# Patient Record
Sex: FEMALE | Race: WHITE | NOT HISPANIC OR LATINO | Employment: PART TIME | URBAN - METROPOLITAN AREA
[De-identification: names, ages, dates, MRNs, and addresses within clinical notes are randomized per-mention and may not be internally consistent; named-entity substitution may affect disease eponyms.]

---

## 2018-07-30 ENCOUNTER — HOSPITAL ENCOUNTER (OUTPATIENT)
Dept: ONCOLOGY | Facility: HOSPITAL | Age: 28
Discharge: HOME | End: 2018-07-30
Attending: INTERNAL MEDICINE | Admitting: INTERNAL MEDICINE

## 2018-07-30 ENCOUNTER — HOSPITAL ENCOUNTER (OUTPATIENT)
Dept: ONCOLOGY | Facility: CLINIC | Age: 28
Setting detail: INFUSION SERIES
Discharge: HOME | End: 2018-07-30
Attending: INTERNAL MEDICINE | Admitting: INTERNAL MEDICINE

## 2018-07-30 ENCOUNTER — CLINICAL SUPPORT (OUTPATIENT)
Dept: ONCOLOGY | Facility: HOSPITAL | Age: 28
End: 2018-07-30

## 2018-07-30 NOTE — PROGRESS NOTES
PATIENTS ONCOLOGY RECORD LOCATED IN [facility]      Subjective     Name:  HARRIET LUJAN     Date:  2018  Address:  [address] [place] [address] [place] [address]  Home: [phone]  :  1990 AGE:  27 y.o.        RECORDS OBTAINED:  Patients Oncology Record is located in [facility]

## 2018-08-23 ENCOUNTER — HOSPITAL ENCOUNTER (OUTPATIENT)
Dept: ONCOLOGY | Facility: CLINIC | Age: 28
Setting detail: INFUSION SERIES
Discharge: HOME | End: 2018-08-23
Attending: INTERNAL MEDICINE | Admitting: INTERNAL MEDICINE

## 2018-08-23 ENCOUNTER — CLINICAL SUPPORT (OUTPATIENT)
Dept: ONCOLOGY | Facility: HOSPITAL | Age: 28
End: 2018-08-23

## 2018-08-23 NOTE — PROGRESS NOTES
PATIENTS ONCOLOGY RECORD LOCATED IN [facility]      Subjective     Name:  HARRIET LUJAN     Date:  2018  Address:  [address] [place] [address] [place] [address]  Home: [phone]  :  1990 AGE:  28 y.o.        RECORDS OBTAINED:  Patients Oncology Record is located in [facility]

## 2018-09-11 ENCOUNTER — HOSPITAL ENCOUNTER (OUTPATIENT)
Dept: ONCOLOGY | Facility: CLINIC | Age: 28
Discharge: HOME | End: 2018-09-11
Attending: INTERNAL MEDICINE | Admitting: INTERNAL MEDICINE

## 2019-02-19 ENCOUNTER — HOSPITAL ENCOUNTER (OUTPATIENT)
Dept: ONCOLOGY | Facility: CLINIC | Age: 29
Discharge: HOME | End: 2019-02-19
Attending: INTERNAL MEDICINE | Admitting: INTERNAL MEDICINE

## 2019-03-08 ENCOUNTER — HOSPITAL ENCOUNTER (OUTPATIENT)
Dept: ONCOLOGY | Facility: CLINIC | Age: 29
Setting detail: INFUSION SERIES
Discharge: HOME | End: 2019-03-08
Attending: INTERNAL MEDICINE | Admitting: INTERNAL MEDICINE

## 2019-03-08 ENCOUNTER — CLINICAL SUPPORT (OUTPATIENT)
Dept: ONCOLOGY | Facility: HOSPITAL | Age: 29
End: 2019-03-08

## 2019-03-08 NOTE — PROGRESS NOTES
PATIENTS ONCOLOGY RECORD LOCATED IN [facility]      Subjective     Name:  HARRIET LUJAN     Date:  2019  Address:  [address] [place] [address] [place] [address]  Home: [unfilled]  :  1990 AGE:  28 y.o.        RECORDS OBTAINED:  Patients Oncology Record is located in [facility]

## 2019-03-15 ENCOUNTER — CLINICAL SUPPORT (OUTPATIENT)
Dept: ONCOLOGY | Facility: HOSPITAL | Age: 29
End: 2019-03-15

## 2019-03-15 ENCOUNTER — HOSPITAL ENCOUNTER (OUTPATIENT)
Dept: ONCOLOGY | Facility: CLINIC | Age: 29
Setting detail: INFUSION SERIES
Discharge: HOME | End: 2019-03-15
Attending: INTERNAL MEDICINE | Admitting: INTERNAL MEDICINE

## 2019-03-15 NOTE — PROGRESS NOTES
PATIENTS ONCOLOGY RECORD LOCATED IN [facility]      Subjective     Name:  HARRIET LUJAN     Date:  03/15/2019  Address:  [address] [place] [address] [place] [address]  Home: [unfilled]  :  1990 AGE:  28 y.o.        RECORDS OBTAINED:  Patients Oncology Record is located in [facility]

## 2019-04-29 ENCOUNTER — HOSPITAL ENCOUNTER (OUTPATIENT)
Dept: ONCOLOGY | Facility: CLINIC | Age: 29
Setting detail: INFUSION SERIES
Discharge: HOME | End: 2019-04-29
Attending: INTERNAL MEDICINE | Admitting: INTERNAL MEDICINE

## 2019-04-29 ENCOUNTER — CLINICAL SUPPORT (OUTPATIENT)
Dept: ONCOLOGY | Facility: HOSPITAL | Age: 29
End: 2019-04-29

## 2019-04-29 NOTE — PROGRESS NOTES
PATIENTS ONCOLOGY RECORD LOCATED IN [facility]      Subjective     Name:  HARRIET LUJAN     Date:  2019  Address:  [address] [place] [address] [place] [address]  Home: [unfilled]  :  1990 AGE:  28 y.o.        RECORDS OBTAINED:  Patients Oncology Record is located in [facility]

## 2019-11-05 ENCOUNTER — OFFICE VISIT (OUTPATIENT)
Dept: ONCOLOGY | Facility: CLINIC | Age: 29
End: 2019-11-05

## 2019-11-05 VITALS
BODY MASS INDEX: 41.23 KG/M2 | DIASTOLIC BLOOD PRESSURE: 70 MMHG | HEIGHT: 60 IN | HEART RATE: 68 BPM | TEMPERATURE: 98.4 F | RESPIRATION RATE: 18 BRPM | WEIGHT: 210 LBS | SYSTOLIC BLOOD PRESSURE: 115 MMHG

## 2019-11-05 DIAGNOSIS — D50.9 IRON DEFICIENCY ANEMIA, UNSPECIFIED IRON DEFICIENCY ANEMIA TYPE: Primary | ICD-10-CM

## 2019-11-05 PROCEDURE — 99213 OFFICE O/P EST LOW 20 MIN: CPT | Performed by: INTERNAL MEDICINE

## 2019-11-05 RX ORDER — DOXYCYCLINE HYCLATE 50 MG/1
1 CAPSULE, GELATIN COATED ORAL 2 TIMES DAILY
Refills: 1 | COMMUNITY
Start: 2019-11-03 | End: 2019-12-04 | Stop reason: SDUPTHER

## 2019-11-05 RX ORDER — NORGESTIMATE AND ETHINYL ESTRADIOL 7DAYSX3 28
KIT ORAL
COMMUNITY
Start: 2019-08-19

## 2019-11-05 RX ORDER — SUCRALFATE 1 G/1
TABLET ORAL
Refills: 6 | COMMUNITY
Start: 2019-10-10

## 2019-11-05 RX ORDER — OMEPRAZOLE 40 MG/1
CAPSULE, DELAYED RELEASE ORAL
COMMUNITY
Start: 2019-10-14

## 2019-11-05 RX ORDER — LEVOTHYROXINE SODIUM 0.03 MG/1
25 TABLET ORAL DAILY
COMMUNITY

## 2019-11-05 NOTE — PROGRESS NOTES
HEMATOLOGY ONCOLOGY FOLLOW UP        Patient name: Savanah Reyez  : 1990  MRN: [id number]  Primary Care Physician: Anthony Iraheta MD  Referring Physician: Anthony Iraheta,*  Reason For Consult:     Chief Complaint   Patient presents with   • Follow-up     FARHANA       History of Present Illness:  Ms. Reyez is a pleasant 28-year-old female who presents to our office on 18 for consultation regarding iron deficiency anemia.  She had lab work done on 18 that showed ferritin of 7 and iron saturation of 55.  Her hemoglobin was 10.4 on 7/3/18.  She is referred to us for iron deficiency anemia.  She reports history of menorrhagia.    • 7/3/18 - WBC 8.2, hemoglobin 10.4, platelet count 361,000, MCV 82.3.    • 7/10/18 - Creatinine 0.49, BUN 6.  LFTs normal.    • 18 - Iron saturation 5%, serum iron 18, TIBC 370.  Folate 12.7.  B12 543.  Ferritin 7.1.    2018 - The patient presents for initial consultation.  She reports irregular and heavy menstrual cycles.  She has to use four large pads for about 6-7 days each cycle.  She is not on any iron.  She reports stomach pain.  She had an episode of dark black stool last month.  She is scheduled for an upper GI endoscopy with Dr. Harding on 18.    • 18 - B12 606.   • 8/10/18 - EGD:  Grade 4 erosive esophagitis.  Distal esophageal stricture.  Grade 3 to grade 4 erosive gastritis and duodenitis.  Gastric biopsy was unremarkable.  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  Gastric fundus mucosa with chronic inflammation.    • 18 - Stool Hemoccult test negative x3.    • 18 - Ferritin 6.4 (L).  Iron saturation 6% (L), TIBC 405, serum iron 24.    • 19 - WBC 7.2, hemoglobin 13, platelet count 359,000.  Ferritin 10 (L).  Iron saturation 15%, TIBC 487 (H), serum iron 75.  (Patient was on iron p.o. two times a day.)  • 3/8/19 - Patient received Injectafer 750 mg x1.    • 3/15/19 -  Patient received Injectafer 750 mg week 2.    • 4/23/19 - Serum iron 83, TIBC 251, iron saturation 25%.  WBC 8.1, hemoglobin 14.2, platelet count 337,000, MCV 88.9.  \  • 10/29/2019 WBC 7.1, hemoglobin 13.3, MCV 92.5, platelets 351, ferritin 131, iron 70  iron saturation 20%, TIBC 345        Subjective:11/05/19  Patient is here for a follow up appointment. She did have heavy menstrual cycles, but has started birth control and that has subsided. She does take iron po twice daily.  She is tolerating iron supplements well.  No complaints.  She denies any fatigue.          The following portions of the patient's history were reviewed and updated as appropriate: allergies, current medications, past family history, past medical history, past social history, past surgical history and problem list.         Past Medical History:   Diagnosis Date   • Anxiety    • Broken arm 2000   • Chronic headaches    • Hirsutism    • Hypothyroidism    • Irregular periods    • Metabolic syndrome    • Obesity    • PCOS (polycystic ovarian syndrome)    • Psoriasis        Past Surgical History:   Procedure Laterality Date   • CYST REMOVAL      under left breast   • ENDOSCOPY           Current Outpatient Medications:   •  ferrous gluconate (FERGON) 324 MG tablet, Take 1 tablet by mouth 2 (Two) Times a Day., Disp: , Rfl: 1  •  levothyroxine (SYNTHROID, LEVOTHROID) 25 MCG tablet, Take 25 mcg by mouth Daily., Disp: , Rfl:   •  metFORMIN (GLUCOPHAGE) 500 MG tablet, Take 500 mg by mouth 2 (Two) Times a Day., Disp: , Rfl: 0  •  omeprazole (priLOSEC) 40 MG capsule, , Disp: , Rfl:   •  sucralfate (CARAFATE) 1 g tablet, TAKE 1 TABLET BY MOUTH 4 TIMES DAILY FOR 30 DAYS, Disp: , Rfl: 6  •  TRI-SPRINTEC 0.18/0.215/0.25 MG-35 MCG per tablet, , Disp: , Rfl:   •  VITAMIN E PO, Take 1 tablet by mouth Daily., Disp: , Rfl:     No Known Allergies    Family History   Problem Relation Age of Onset   • Anemia Father        Cancer-related family history is not on  "file.    Social History     Tobacco Use   • Smoking status: Never Smoker   • Smokeless tobacco: Never Used   Substance Use Topics   • Alcohol use: No     Frequency: Never   • Drug use: No         I have reviewed the history of present illness, past medical history, family history, social history, lab results, all notes and other records since the patient was last seen on  Visit date not found.    ROS:     Review of Systems   Constitutional: Negative for chills and fever.   HENT: Negative for ear pain, mouth sores, nosebleeds and sore throat.    Eyes: Negative for photophobia and visual disturbance.   Respiratory: Negative for wheezing and stridor.    Cardiovascular: Negative for chest pain and palpitations.   Gastrointestinal: Negative for abdominal pain, diarrhea, nausea and vomiting.   Endocrine: Negative for cold intolerance and heat intolerance.   Genitourinary: Negative for dysuria and hematuria.   Musculoskeletal: Negative for joint swelling and neck stiffness.   Skin: Negative for color change and rash.   Neurological: Negative for seizures and syncope.   Hematological: Negative for adenopathy.        No obvious bleeding   Psychiatric/Behavioral: Negative for agitation, confusion and hallucinations.       Objective:    Vitals:    11/05/19 1430   BP: 115/70   Pulse: 68   Resp: 18   Temp: 98.4 °F (36.9 °C)   TempSrc: Oral   Weight: 95.3 kg (210 lb)   Height: 152.4 cm (60\")   PainSc: 0-No pain     ECOG  (0) Fully active, able to carry on all predisease performance without restriction    Physical Exam:   Physical Exam   Constitutional: She is oriented to person, place, and time. No distress.   HENT:   Head: Normocephalic and atraumatic.   Eyes: Conjunctivae and EOM are normal. Right eye exhibits no discharge. Left eye exhibits no discharge. No scleral icterus.   Neck: Normal range of motion. Neck supple. No thyromegaly present.   Cardiovascular: Normal rate, regular rhythm and normal heart sounds. Exam reveals no " gallop and no friction rub.   Pulmonary/Chest: Effort normal. No stridor. No respiratory distress. She has no wheezes.   Abdominal: Soft. Bowel sounds are normal. She exhibits no mass. There is no tenderness. There is no rebound and no guarding.   Musculoskeletal: Normal range of motion. She exhibits no tenderness.   Lymphadenopathy:     She has no cervical adenopathy.   Neurological: She is alert and oriented to person, place, and time. She exhibits normal muscle tone.   Skin: Skin is warm. No rash noted. She is not diaphoretic. No erythema.   Psychiatric: She has a normal mood and affect. Her behavior is normal.   Nursing note and vitals reviewed.            No results for input(s): WBC, HGB, HCT, PLT, MCV in the last 69693 hours.  No results for input(s): NA, K, CL, CO2, BUN, CREATININE, CALCIUM, BILITOT, ALKPHOS, ALT, AST, GLUCOSE in the last 58579 hours.    Invalid input(s): LABALBU, PROT    No results found for: GLUCOSE, BUN, CREATININE, EGFRIFNONA, EGFRIFAFRI, BCR, K, CO2, CALCIUM, PROTENTOTREF, ALBUMIN, LABIL2, AST, ALT    No results for input(s): APTT, INR, PTT in the last 11134 hours.    No results found for: IRON, TIBC, FERRITIN    No results found for: FOLATE    No results found for: OCCULTBLD    No results found for: RETICCTPCT    No results found for: [id number]  No results found for: SPEP, UPEP  No results found for: LDH, URICACID  No results found for: BRAULIO, RF, SEDRATE  No results found for: FIBRINOGEN, HAPTOGLOBIN  No results found for: PTT, INR  No results found for:   No results found for: CEA  No components found for: CA-19-9  No results found for: PSA          Assessment/Plan     Assessment:  1. Iron deficiency anemia:  Etiology could be menorrhagia.  She had an EGD and that shows Dela Cruz’s mucosa in the distal esophagus.  There is some chronic inflammation in the stomach.  EGD reported as showing erosive gastritis and duodenitis.  She is currently taking two iron pills a day.  Despite  that, she had low iron levels in February and therefore, was given two doses of Injectafer.  Iron levels improved after Injectafer.  She also likely has poor iron absorption.  2. Menorrhagia:  She is now on oral contraceptive pills.  She continues to have heavy cycles.    3. Dela Cruz’s esophagus:  Will need regular screening endoscopies.      PLAN:  1. Continue iron p.o. two times a day.  2. Repeat iron levels in six months.  If iron levels continue to increase, we can discontinue her iron or  lower the dose of her iron supplements.  3. Advised to discuss with her PCP regarding her menorrhagia.  4. Follow up in six months.        Iron deficiency anemia, unspecified iron deficiency anemia type  - CBC & Differential  - Ferritin  - Iron Profile    Orders Placed This Encounter   Procedures   • Ferritin     1 week prior to appt in 6 months     Standing Status:   Future     Scheduling Instructions:      Please draw these labs prior to the next visit.   • Iron Profile     1 week prior to appt in 6 months     Standing Status:   Future     Scheduling Instructions:      Please draw these labs prior to the next visit.   • CBC & Differential     Please draw these labs prior to the next visit.  1 week prior to appt in 6 months     Standing Status:   Future     Scheduling Instructions:      Please draw these labs prior to the next visit.     Order Specific Question:   Manual Differential     Answer:   No                     Thank you very much for providing the opportunity to participate in this patient’s care. Please do not hesitate to call if there are any other questions.    I have reviewed all relevant labs results,outside reports, imaging,,notes, vitals, medications and plan with the patient today.    Portions of the note have been Scribed by medical assistant/Nurse.  I have reviewed and made changes accordingly.

## 2019-12-05 RX ORDER — DOXYCYCLINE HYCLATE 50 MG/1
CAPSULE, GELATIN COATED ORAL
Qty: 60 TABLET | Refills: 1 | Status: SHIPPED | OUTPATIENT
Start: 2019-12-05 | End: 2020-02-03

## 2020-02-03 RX ORDER — DOXYCYCLINE HYCLATE 50 MG/1
CAPSULE, GELATIN COATED ORAL
Qty: 60 TABLET | Refills: 1 | Status: SHIPPED | OUTPATIENT
Start: 2020-02-03 | End: 2020-04-03

## 2020-04-03 RX ORDER — DOXYCYCLINE HYCLATE 50 MG/1
CAPSULE, GELATIN COATED ORAL
Qty: 60 TABLET | Refills: 0 | Status: SHIPPED | OUTPATIENT
Start: 2020-04-03 | End: 2020-05-04

## 2020-05-04 RX ORDER — DOXYCYCLINE HYCLATE 50 MG/1
CAPSULE, GELATIN COATED ORAL
Qty: 60 TABLET | Refills: 0 | Status: SHIPPED | OUTPATIENT
Start: 2020-05-04 | End: 2020-05-05

## 2020-05-05 RX ORDER — DOXYCYCLINE HYCLATE 50 MG/1
CAPSULE, GELATIN COATED ORAL
Qty: 60 TABLET | Refills: 0 | Status: SHIPPED | OUTPATIENT
Start: 2020-05-05 | End: 2020-07-06

## 2020-07-06 RX ORDER — DOXYCYCLINE HYCLATE 50 MG/1
CAPSULE, GELATIN COATED ORAL
Qty: 60 TABLET | Refills: 0 | Status: SHIPPED | OUTPATIENT
Start: 2020-07-06 | End: 2020-08-17

## 2020-08-17 RX ORDER — DOXYCYCLINE HYCLATE 50 MG/1
CAPSULE, GELATIN COATED ORAL
Qty: 60 TABLET | Refills: 0 | Status: SHIPPED | OUTPATIENT
Start: 2020-08-17 | End: 2020-11-02

## 2020-11-02 RX ORDER — DOXYCYCLINE HYCLATE 50 MG/1
1 CAPSULE, GELATIN COATED ORAL 2 TIMES DAILY
Qty: 60 TABLET | Refills: 5 | Status: SHIPPED | OUTPATIENT
Start: 2020-11-02 | End: 2021-05-18

## 2020-11-06 DIAGNOSIS — D50.9 IRON DEFICIENCY ANEMIA, UNSPECIFIED IRON DEFICIENCY ANEMIA TYPE: ICD-10-CM

## 2020-11-07 LAB
BASOPHILS # BLD AUTO: 0.1 X10E3/UL (ref 0–0.2)
BASOPHILS NFR BLD AUTO: 1 %
EOSINOPHIL # BLD AUTO: 0.1 X10E3/UL (ref 0–0.4)
EOSINOPHIL NFR BLD AUTO: 2 %
ERYTHROCYTE [DISTWIDTH] IN BLOOD BY AUTOMATED COUNT: 11.9 % (ref 11.7–15.4)
FERRITIN SERPL-MCNC: 36 NG/ML (ref 15–150)
HCT VFR BLD AUTO: 37.5 % (ref 34–46.6)
HGB BLD-MCNC: 12.7 G/DL (ref 11.1–15.9)
IMM GRANULOCYTES # BLD AUTO: 0 X10E3/UL (ref 0–0.1)
IMM GRANULOCYTES NFR BLD AUTO: 0 %
IRON SATN MFR SERPL: 10 % (ref 15–55)
IRON SERPL-MCNC: 39 UG/DL (ref 27–159)
LYMPHOCYTES # BLD AUTO: 2.6 X10E3/UL (ref 0.7–3.1)
LYMPHOCYTES NFR BLD AUTO: 39 %
MCH RBC QN AUTO: 28.9 PG (ref 26.6–33)
MCHC RBC AUTO-ENTMCNC: 33.9 G/DL (ref 31.5–35.7)
MCV RBC AUTO: 85 FL (ref 79–97)
MONOCYTES # BLD AUTO: 0.5 X10E3/UL (ref 0.1–0.9)
MONOCYTES NFR BLD AUTO: 7 %
NEUTROPHILS # BLD AUTO: 3.4 X10E3/UL (ref 1.4–7)
NEUTROPHILS NFR BLD AUTO: 51 %
PLATELET # BLD AUTO: 338 X10E3/UL (ref 150–450)
RBC # BLD AUTO: 4.4 X10E6/UL (ref 3.77–5.28)
TIBC SERPL-MCNC: 408 UG/DL (ref 250–450)
UIBC SERPL-MCNC: 369 UG/DL (ref 131–425)
WBC # BLD AUTO: 6.6 X10E3/UL (ref 3.4–10.8)

## 2020-11-10 ENCOUNTER — OFFICE VISIT (OUTPATIENT)
Dept: ONCOLOGY | Facility: CLINIC | Age: 30
End: 2020-11-10

## 2020-11-10 VITALS
WEIGHT: 209.5 LBS | BODY MASS INDEX: 41.13 KG/M2 | HEIGHT: 60 IN | TEMPERATURE: 97.6 F | RESPIRATION RATE: 16 BRPM | SYSTOLIC BLOOD PRESSURE: 133 MMHG | DIASTOLIC BLOOD PRESSURE: 84 MMHG | HEART RATE: 80 BPM

## 2020-11-10 DIAGNOSIS — D50.9 IRON DEFICIENCY ANEMIA, UNSPECIFIED IRON DEFICIENCY ANEMIA TYPE: Primary | ICD-10-CM

## 2020-11-10 PROCEDURE — 99213 OFFICE O/P EST LOW 20 MIN: CPT | Performed by: INTERNAL MEDICINE

## 2020-11-10 NOTE — PROGRESS NOTES
HEMATOLOGY ONCOLOGY FOLLOW UP        Patient name: Savanah Reyez  : 1990  MRN: [id number]  Primary Care Physician: Anthony Iraheta MD  Referring Physician: Anthony Iraheta,*  Reason For Consult:     Chief Complaint   Patient presents with   • Follow-up     Iron deficiency anemia, unspecified iron deficiency anemia type       History of Present Illness:  Ms. Reyez is a pleasant 30 y.o.female who presents to our office on 18 for consultation regarding iron deficiency anemia.  She had lab work done on 18 that showed ferritin of 7 and iron saturation of 55.  Her hemoglobin was 10.4 on 7/3/18.  She is referred to us for iron deficiency anemia.  She reports history of menorrhagia.    • 7/3/18 - WBC 8.2, hemoglobin 10.4, platelet count 361,000, MCV 82.3.    • 7/10/18 - Creatinine 0.49, BUN 6.  LFTs normal.    • 18 - Iron saturation 5%, serum iron 18, TIBC 370.  Folate 12.7.  B12 543.  Ferritin 7.1.    2018 - The patient presents for initial consultation.  She reports irregular and heavy menstrual cycles.  She has to use four large pads for about 6-7 days each cycle.  She is not on any iron.  She reports stomach pain.  She had an episode of dark black stool last month.  She is scheduled for an upper GI endoscopy with Dr. Harding on 18.    • 18 - B12 606.   • 8/10/18 - EGD:  Grade 4 erosive esophagitis.  Distal esophageal stricture.  Grade 3 to grade 4 erosive gastritis and duodenitis.  Gastric biopsy was unremarkable.  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  Gastric fundus mucosa with chronic inflammation.    • 18 - Stool Hemoccult test negative x3.    • 18 - Ferritin 6.4 (L).  Iron saturation 6% (L), TIBC 405, serum iron 24.    • 19 - WBC 7.2, hemoglobin 13, platelet count 359,000.  Ferritin 10 (L).  Iron saturation 15%, TIBC 487 (H), serum iron 75.  (Patient was on iron p.o. two times a day.)  • 3/8/19  - Patient received Injectafer 750 mg x1.    • 3/15/19 - Patient received Injectafer 750 mg week 2.    • 4/23/19 - Serum iron 83, TIBC 251, iron saturation 25%.  WBC 8.1, hemoglobin 14.2, platelet count 337,000, MCV 88.9.  \  • 10/29/2019 WBC 7.1, hemoglobin 13.3, MCV 92.5, platelets 351, ferritin 131, iron 70  iron saturation 20%, TIBC 345  • 5/1/2020: WBC 6.9, hemoglobin 13.9, MCV 90.2, platelets 319, ferritin 53.6, iron saturation 39%, serum iron 143, TIBC 363  • 11/6/2020: Ferritin 36, , iron saturation 10% low, serum iron 39, TIBC 408, WBC 6.6, hemoglobin 12.7, platelets 338        Subjective:  Patient presents for a follow up regarding iron deficiency anemia.  She is currently taking 1 iron tablet daily.  She is reporting symptoms of fatigue.  She continues to have menorrhagia.  She is going to start a new job with a better insurance.        The following portions of the patient's history were reviewed and updated as appropriate: allergies, current medications, past family history, past medical history, past social history, past surgical history and problem list.         Past Medical History:   Diagnosis Date   • Anxiety    • Broken arm 2000   • Chronic headaches    • Hirsutism    • Hypothyroidism    • Irregular periods    • Metabolic syndrome    • Obesity    • PCOS (polycystic ovarian syndrome)    • Psoriasis        Past Surgical History:   Procedure Laterality Date   • CYST REMOVAL      under left breast   • ENDOSCOPY           Current Outpatient Medications:   •  Cholecalciferol (VITAMIN D3) 50 MCG (2000 UT) capsule, Take 2,000 Units by mouth Daily., Disp: , Rfl:   •  ferrous gluconate (FERGON) 324 MG tablet, Take 1 tablet by mouth 2 (Two) Times a Day., Disp: 60 tablet, Rfl: 5  •  levothyroxine (SYNTHROID, LEVOTHROID) 25 MCG tablet, Take 25 mcg by mouth Daily., Disp: , Rfl:   •  metFORMIN (GLUCOPHAGE) 500 MG tablet, Take 500 mg by mouth 2 (Two) Times a Day., Disp: , Rfl: 0  •  metFORMIN ER (GLUCOPHAGE-XR)  500 MG 24 hr tablet, TAKE 2 TABLETS BY MOUTH ONCE DAILY FOR 90 DAYS, Disp: , Rfl:   •  omeprazole (priLOSEC) 40 MG capsule, , Disp: , Rfl:   •  sucralfate (CARAFATE) 1 g tablet, TAKE 1 TABLET BY MOUTH 4 TIMES DAILY FOR 30 DAYS, Disp: , Rfl: 6  •  TRI-SPRINTEC 0.18/0.215/0.25 MG-35 MCG per tablet, , Disp: , Rfl:   •  VITAMIN E PO, Take 1 tablet by mouth Daily., Disp: , Rfl:     No Known Allergies    Family History   Problem Relation Age of Onset   • Anemia Father        Cancer-related family history is not on file.    Social History     Tobacco Use   • Smoking status: Never Smoker   • Smokeless tobacco: Never Used   Substance Use Topics   • Alcohol use: No     Frequency: Never   • Drug use: No         I have reviewed the history of present illness, past medical history, family history, social history, lab results, all notes and other records since the patient was last seen on  Visit date not found.    ROS:     Review of Systems   Constitutional: Positive for fatigue. Negative for chills, diaphoresis and fever.   HENT: Negative for ear pain, mouth sores, nosebleeds and sore throat.    Eyes: Negative for photophobia and visual disturbance.   Respiratory: Negative for wheezing and stridor.    Cardiovascular: Negative for chest pain and palpitations.   Gastrointestinal: Negative for abdominal pain, diarrhea, nausea and vomiting.   Endocrine: Negative for cold intolerance and heat intolerance.   Genitourinary: Positive for menstrual problem. Negative for dysuria and hematuria.   Musculoskeletal: Negative for joint swelling and neck stiffness.   Skin: Negative for color change and rash.   Neurological: Negative for seizures and syncope.   Hematological: Negative for adenopathy.        No obvious bleeding   Psychiatric/Behavioral: Negative for agitation, confusion and hallucinations.       Objective:    Vitals:    11/10/20 1445   BP: 133/84   Pulse: 80   Resp: 16   Temp: 97.6 °F (36.4 °C)   Weight: 95 kg (209 lb 8 oz)  "  Height: 152.4 cm (60\")     ECOG  (0) Fully active, able to carry on all predisease performance without restriction    Physical Exam:   Physical Exam   Constitutional: She is oriented to person, place, and time. She appears well-developed.   HENT:   Head: Normocephalic and atraumatic.   Nose: Nose normal.   Mouth/Throat: No oropharyngeal exudate.   Eyes: Conjunctivae are normal. No scleral icterus.   Neck: Normal range of motion. No tracheal deviation present. No thyromegaly present.   Cardiovascular: Regular rhythm, normal heart sounds and normal pulses.   Pulmonary/Chest: No stridor.   Abdominal: Soft. Normal appearance and bowel sounds are normal. She exhibits no distension and no mass. There is no abdominal tenderness.   Musculoskeletal: Normal range of motion. No deformity.   Lymphadenopathy:     She has no cervical adenopathy.   Neurological: She is alert and oriented to person, place, and time. No sensory deficit.   Skin: Skin is warm.   Psychiatric: Her behavior is normal. Mood, judgment and thought content normal.   Vitals reviewed.      Lab Results - Last 18 Months   Lab Units 11/06/20  1453   WBC x10E3/uL 6.6   HEMOGLOBIN g/dL 12.7   HEMATOCRIT % 37.5   PLATELETS x10E3/uL 338   MCV fL 85     No results for input(s): NA, K, CL, CO2, BUN, CREATININE, CALCIUM, BILITOT, ALKPHOS, ALT, AST, GLUCOSE in the last 62810 hours.    Invalid input(s): LABALBU, PROT    No results found for: GLUCOSE, BUN, CREATININE, EGFRIFNONA, EGFRIFAFRI, BCR, K, CO2, CALCIUM, PROTENTOTREF, ALBUMIN, LABIL2, BILIRUBIN, AST, ALT    No results for input(s): APTT, INR, PTT in the last 01365 hours.    Lab Results   Component Value Date    TIBC 408 11/06/2020    FERRITIN 36 11/06/2020       No results found for: FOLATE    No results found for: OCCULTBLD    No results found for: RETICCTPCT    No results found for: [id number]  No results found for: SPEP, UPEP  No results found for: LDH, URICACID  No results found for: BRAULIO, RF, SEDRATE  No " results found for: FIBRINOGEN, HAPTOGLOBIN  No results found for: PTT, INR  No results found for:   No results found for: CEA  No components found for: CA-19-9  No results found for: PSA          Assessment/Plan     Assessment:  1. Iron deficiency anemia:  Etiology could be menorrhagia.  She had an EGD and that shows Dela Cruz’s mucosa in the distal esophagus.  There is some chronic inflammation in the stomach.  EGD reported as showing erosive gastritis and duodenitis.  She is currently taking two iron pills a day.  Despite that, she had low iron levels in February and therefore, was given two doses of Injectafer. She also likely has poor iron absorption.  She is now on oral iron.  Recent iron stores are somewhat lower.  2. Menorrhagia:  She is now on oral contraceptive pills.  Her menorrhagia has definitely decreased but has not completely gone away.  3. Dela Cruz’s esophagus:  Will need regular screening endoscopies.      PLAN:  1. Iron stores are somewhat lower.  Advised to increase iron supplement to p.o. twice daily.  2. Her insurance is going to be changed.  She is planning to see a gynecologist at that point since she will have better insurance.  3. Follow up in six months with CBC and iron levels..        There are no diagnoses linked to this encounter.  No orders of the defined types were placed in this encounter.      I have reviewed and confirmed the accuracy of the patient's history: Chief complaint, HPI, ROS and Subjective as entered by the MA/LPN/RN. Fernando Lora MD 11/10/20         Electronically signed by Fernando Lora MD, 11/10/20, 3:23 PM EST.

## 2020-11-17 ENCOUNTER — APPOINTMENT (OUTPATIENT)
Dept: ONCOLOGY | Facility: CLINIC | Age: 30
End: 2020-11-17

## 2021-04-16 ENCOUNTER — TELEPHONE (OUTPATIENT)
Dept: ONCOLOGY | Facility: CLINIC | Age: 31
End: 2021-04-16

## 2021-04-16 NOTE — TELEPHONE ENCOUNTER
Caller: Savanah Reyez    Relationship: Self    Best call back number: [phone]     What is the best time to reach you: ANY    Who are you requesting to speak with (clinical staff, provider,  specific staff member): ANY    Do you know the name of the person who called: PATIENT    What was the call regarding: *PATIENT WANTED TO KNOW IF SHE NEEDED TO SCHEDULE LABS BEFORE HER APPOINTMENT, PATIENT CALLED INTO [facility].    Do you require a callback: YES

## 2021-04-29 ENCOUNTER — TELEPHONE (OUTPATIENT)
Dept: ONCOLOGY | Facility: CLINIC | Age: 31
End: 2021-04-29

## 2021-04-29 DIAGNOSIS — D50.9 IRON DEFICIENCY ANEMIA, UNSPECIFIED IRON DEFICIENCY ANEMIA TYPE: Primary | ICD-10-CM

## 2021-04-29 NOTE — TELEPHONE ENCOUNTER
Caller: PT    Relationship: PT    Best call back number: [phone]    What is the best time to reach you:    Who are you requesting to speak with (clinical staff, provider,  specific staff member): CLINICAL    Do you know the name of the person who called:    What was the call regarding: PT VERIFYING IF SHE WILL NEED LABS 5/25 APPT?     Do you require a callback: YES

## 2021-05-17 ENCOUNTER — TELEPHONE (OUTPATIENT)
Dept: ONCOLOGY | Facility: CLINIC | Age: 31
End: 2021-05-17

## 2021-05-17 NOTE — TELEPHONE ENCOUNTER
Caller: ABE LUJAN    Relationship to patient: SELF    Best call back number: [phone]    Type of visit: FOLLOW UP    Requested date: 6/10 OR 6/24    If rescheduling, when is the original appointment: 5/25    Additional notes: PLEASE CALL TO RESCHEDULE

## 2021-05-18 RX ORDER — DOXYCYCLINE HYCLATE 50 MG/1
1 CAPSULE, GELATIN COATED ORAL 2 TIMES DAILY
Qty: 60 TABLET | Refills: 3 | Status: SHIPPED | OUTPATIENT
Start: 2021-05-18 | End: 2021-09-22

## 2021-05-18 RX ORDER — DOXYCYCLINE HYCLATE 50 MG/1
CAPSULE, GELATIN COATED ORAL
Qty: 60 TABLET | Refills: 4 | Status: SHIPPED | OUTPATIENT
Start: 2021-05-18 | End: 2021-05-18 | Stop reason: SDUPTHER

## 2021-05-24 ENCOUNTER — TELEPHONE (OUTPATIENT)
Dept: ONCOLOGY | Facility: CLINIC | Age: 31
End: 2021-05-24

## 2021-05-25 ENCOUNTER — TELEPHONE (OUTPATIENT)
Dept: ONCOLOGY | Facility: CLINIC | Age: 31
End: 2021-05-25

## 2021-05-25 ENCOUNTER — APPOINTMENT (OUTPATIENT)
Dept: ONCOLOGY | Facility: CLINIC | Age: 31
End: 2021-05-25

## 2021-06-08 NOTE — PROGRESS NOTES
HEMATOLOGY ONCOLOGY FOLLOW UP        Patient name: Savanah Reyez  : 1990  MRN: [id number]  Primary Care Physician: Anthony Iraheta MD  Referring Physician: Anthony Iraheta,*  Reason For Consult:     Chief Complaint   Patient presents with   • Follow-up     Iron deficiency anemia, unspecified iron deficiency anemia type       History of Present Illness:  Ms. Reyez is a pleasant 30 y.o.female who presents to our office on 18 for consultation regarding iron deficiency anemia.  She had lab work done on 18 that showed ferritin of 7 and iron saturation of 55.  Her hemoglobin was 10.4 on 7/3/18.  She is referred to us for iron deficiency anemia.  She reports history of menorrhagia.    • 7/3/18 - WBC 8.2, hemoglobin 10.4, platelet count 361,000, MCV 82.3.    • 7/10/18 - Creatinine 0.49, BUN 6.  LFTs normal.    • 18 - Iron saturation 5%, serum iron 18, TIBC 370.  Folate 12.7.  B12 543.  Ferritin 7.1.    2018 - The patient presents for initial consultation.  She reports irregular and heavy menstrual cycles.  She has to use four large pads for about 6-7 days each cycle.  She is not on any iron.  She reports stomach pain.  She had an episode of dark black stool last month.  She is scheduled for an upper GI endoscopy with Dr. Harding on 18.    • 18 - B12 606.   • 8/10/18 - EGD:  Grade 4 erosive esophagitis.  Distal esophageal stricture.  Grade 3 to grade 4 erosive gastritis and duodenitis.  Gastric biopsy was unremarkable.  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  Gastric fundus mucosa with chronic inflammation.    • 18 - Stool Hemoccult test negative x3.    • 18 - Ferritin 6.4 (L).  Iron saturation 6% (L), TIBC 405, serum iron 24.    • 19 - WBC 7.2, hemoglobin 13, platelet count 359,000.  Ferritin 10 (L).  Iron saturation 15%, TIBC 487 (H), serum iron 75.  (Patient was on iron p.o. two times a day.)  • 3/8/19  - Patient received Injectafer 750 mg x1.    • 3/15/19 - Patient received Injectafer 750 mg week 2.    • 4/23/19 - Serum iron 83, TIBC 251, iron saturation 25%.  WBC 8.1, hemoglobin 14.2, platelet count 337,000, MCV 88.9.  \  • 10/29/2019 WBC 7.1, hemoglobin 13.3, MCV 92.5, platelets 351, ferritin 131, iron 70  iron saturation 20%, TIBC 345  • 5/1/2020: WBC 6.9, hemoglobin 13.9, MCV 90.2, platelets 319, ferritin 53.6, iron saturation 39%, serum iron 143, TIBC 363  • 11/6/2020: Ferritin 36, , iron saturation 10% low, serum iron 39, TIBC 408, WBC 6.6, hemoglobin 12.7, platelets 338  • 5/18/2021: Iron 91, WBC 6.9, hemoglobin 13, platelets 356, ferritin 19, iron saturation 20%, TIBC at 458        Subjective:    Patient presents for a follow up regarding iron deficiency anemia.  She is currently taking 2 iron tablet daily.  She is reporting symptoms of fatigue.  She continues to have menorrhagia.         The following portions of the patient's history were reviewed and updated as appropriate: allergies, current medications, past family history, past medical history, past social history, past surgical history and problem list.         Past Medical History:   Diagnosis Date   • Anxiety    • Broken arm 2000   • Chronic headaches    • Hirsutism    • Hypothyroidism    • Irregular periods    • Metabolic syndrome    • Obesity    • PCOS (polycystic ovarian syndrome)    • Psoriasis        Past Surgical History:   Procedure Laterality Date   • CYST REMOVAL      under left breast   • ENDOSCOPY           Current Outpatient Medications:   •  ferrous gluconate (FERGON) 324 MG tablet, Take 1 tablet by mouth 2 (Two) Times a Day., Disp: 60 tablet, Rfl: 3  •  levothyroxine (SYNTHROID, LEVOTHROID) 25 MCG tablet, Take 25 mcg by mouth Daily., Disp: , Rfl:   •  metFORMIN (GLUCOPHAGE) 500 MG tablet, Take 500 mg by mouth 2 (Two) Times a Day., Disp: , Rfl: 0  •  omeprazole (priLOSEC) 40 MG capsule, , Disp: , Rfl:   •  sucralfate (CARAFATE) 1 g  "tablet, TAKE 1 TABLET BY MOUTH 4 TIMES DAILY FOR 30 DAYS, Disp: , Rfl: 6  •  TRI-SPRINTEC 0.18/0.215/0.25 MG-35 MCG per tablet, , Disp: , Rfl:   •  Cholecalciferol (VITAMIN D3) 50 MCG (2000 UT) capsule, Take 2,000 Units by mouth Daily., Disp: , Rfl:   •  metFORMIN ER (GLUCOPHAGE-XR) 500 MG 24 hr tablet, TAKE 2 TABLETS BY MOUTH ONCE DAILY FOR 90 DAYS, Disp: , Rfl:   •  VITAMIN E PO, Take 1 tablet by mouth Daily., Disp: , Rfl:     No Known Allergies    Family History   Problem Relation Age of Onset   • Anemia Father        Cancer-related family history is not on file.    Social History     Tobacco Use   • Smoking status: Never Smoker   • Smokeless tobacco: Never Used   Substance Use Topics   • Alcohol use: No   • Drug use: No         I have reviewed the history of present illness, past medical history, family history, social history, lab results, all notes and other records since the patient was last seen on  Visit date not found.    ROS:       Objective:    Vitals:    06/10/21 1416   BP: 106/69   Pulse: 81   Resp: 18   Temp: 97.5 °F (36.4 °C)   Weight: 98 kg (216 lb)   Height: 152.4 cm (60\")   PainSc: 0-No pain     ECOG  (0) Fully active, able to carry on all predisease performance without restriction    Physical Exam:   Physical Exam   Constitutional: She is oriented to person, place, and time. She appears well-developed.   HENT:   Head: Normocephalic and atraumatic.   Nose: Nose normal.   Mouth/Throat: No oropharyngeal exudate.   Eyes: Conjunctivae are normal. No scleral icterus.   Neck: No tracheal deviation present. No thyromegaly present.   Cardiovascular: Regular rhythm, normal heart sounds and normal pulses.   Pulmonary/Chest: No stridor.   Abdominal: Soft. Normal appearance and bowel sounds are normal. She exhibits no distension and no mass. There is no abdominal tenderness.   Musculoskeletal: Normal range of motion. No deformity.   Lymphadenopathy:     She has no cervical adenopathy.   Neurological: She is " alert and oriented to person, place, and time. No sensory deficit.   Skin: Skin is warm.   Psychiatric: Her behavior is normal. Mood, judgment and thought content normal.   Vitals reviewed.    I have reexamined the patient and the results are consistent with the previously documented exam. Fernando Lora MD       Lab Results - Last 18 Months   Lab Units 11/06/20  1453   WBC x10E3/uL 6.6   HEMOGLOBIN g/dL 12.7   HEMATOCRIT % 37.5   PLATELETS x10E3/uL 338   MCV fL 85     No results for input(s): NA, K, CL, CO2, BUN, CREATININE, CALCIUM, BILITOT, ALKPHOS, ALT, AST, GLUCOSE in the last 55841 hours.    Invalid input(s): LABALBU, PROT    No results found for: GLUCOSE, BUN, CREATININE, EGFRIFNONA, EGFRIFAFRI, BCR, K, CO2, CALCIUM, PROTENTOTREF, ALBUMIN, LABIL2, BILIRUBIN, AST, ALT    No results for input(s): APTT, INR, PTT in the last 56432 hours.    Lab Results   Component Value Date    TIBC 408 11/06/2020    FERRITIN 36 11/06/2020       No results found for: FOLATE    No results found for: OCCULTBLD    No results found for: RETICCTPCT    No results found for: [id number]  No results found for: SPEP, UPEP  No results found for: LDH, URICACID  No results found for: BRAULIO, RF, SEDRATE  No results found for: FIBRINOGEN, HAPTOGLOBIN  No results found for: PTT, INR  No results found for:   No results found for: CEA  No components found for: CA-19-9  No results found for: PSA          Assessment/Plan     Assessment:  1. Iron deficiency anemia:  Etiology could be menorrhagia.  She had an EGD and that shows Dela Cruz’s mucosa in the distal esophagus.  There is some chronic inflammation in the stomach.  EGD reported as showing erosive gastritis and duodenitis.  She is currently taking two iron pills a day.  Despite that, she had low iron levels in February and therefore, was given two doses of Injectafer. She also likely has poor iron absorption.  She is now on oral iron.  Recent iron stores are somewhat lower.  2. Menorrhagia:    Her menorrhagia has definitely decreased but has not completely gone away.  Planning to see a GYN  3. Dela Cruz’s esophagus:  Will need regular screening endoscopies.      PLAN:  1. Iron stores continue to remain low.  Advised patient to take 2 iron tablets daily along with orange juice to enhance absorption..  2. Continue follow-up with gastroenterologist  3. Her insurance has changed.  She is planning to see a gynecologist for her menorrhagia issues.  4. Follow up in six months with CBC and iron levels..        There are no diagnoses linked to this encounter.  No orders of the defined types were placed in this encounter.      I have reviewed and confirmed the accuracy of the patient's history: Chief complaint, HPI, ROS and Subjective as entered by the MA/LPN/RN. Fernando Lora MD 06/10/21         Electronically signed by Fernando Lora MD, 06/10/21, 2:25 PM EDT.

## 2021-06-10 ENCOUNTER — OFFICE VISIT (OUTPATIENT)
Dept: ONCOLOGY | Facility: CLINIC | Age: 31
End: 2021-06-10

## 2021-06-10 ENCOUNTER — LAB (OUTPATIENT)
Dept: LAB | Facility: HOSPITAL | Age: 31
End: 2021-06-10

## 2021-06-10 VITALS
WEIGHT: 216 LBS | SYSTOLIC BLOOD PRESSURE: 106 MMHG | BODY MASS INDEX: 42.41 KG/M2 | RESPIRATION RATE: 18 BRPM | HEIGHT: 60 IN | TEMPERATURE: 97.5 F | DIASTOLIC BLOOD PRESSURE: 69 MMHG | HEART RATE: 81 BPM

## 2021-06-10 DIAGNOSIS — D50.9 IRON DEFICIENCY ANEMIA, UNSPECIFIED IRON DEFICIENCY ANEMIA TYPE: ICD-10-CM

## 2021-06-10 DIAGNOSIS — D50.9 IRON DEFICIENCY ANEMIA, UNSPECIFIED IRON DEFICIENCY ANEMIA TYPE: Primary | ICD-10-CM

## 2021-06-10 PROCEDURE — 99214 OFFICE O/P EST MOD 30 MIN: CPT | Performed by: INTERNAL MEDICINE

## 2021-09-22 DIAGNOSIS — D50.0 IRON DEFICIENCY ANEMIA DUE TO CHRONIC BLOOD LOSS: Primary | ICD-10-CM

## 2021-09-22 NOTE — TELEPHONE ENCOUNTER
Rx Refill Note  Requested Prescriptions     Pending Prescriptions Disp Refills   • ferrous gluconate (FERGON) 324 MG tablet [Pharmacy Med Name: Ferrous Gluconate 324 (38 Fe) MG Oral Tablet] 60 tablet 3     Sig: Take 1 tablet by mouth twice daily      Last office visit with prescribing clinician: 6/10/2021      Next office visit with prescribing clinician: 12/7/2021            Kristyn Kendrick RN  09/22/21, 16:35 EDT

## 2021-09-23 RX ORDER — DOXYCYCLINE HYCLATE 50 MG/1
CAPSULE, GELATIN COATED ORAL
Qty: 60 TABLET | Refills: 3 | Status: SHIPPED | OUTPATIENT
Start: 2021-09-23 | End: 2021-12-20 | Stop reason: SDUPTHER

## 2021-11-30 ENCOUNTER — TELEPHONE (OUTPATIENT)
Dept: ONCOLOGY | Facility: CLINIC | Age: 31
End: 2021-11-30

## 2021-11-30 DIAGNOSIS — D50.9 IRON DEFICIENCY ANEMIA, UNSPECIFIED IRON DEFICIENCY ANEMIA TYPE: Primary | ICD-10-CM

## 2021-11-30 NOTE — TELEPHONE ENCOUNTER
Received a call from the pt stating that she needed to make a lab appointment. She requested that it be done at [place]. Her options were to go to the [place] office on her lunch break or to [facility] after work to have her labs drawn. Pt decided to go to [facility]. Lab orders faxed.

## 2021-11-30 NOTE — TELEPHONE ENCOUNTER
CALLER: HARRIET LUJAN    RELATIONSHIP :SELF      CALL REGARDING: STATED CALLED YESTERDAY LEFT SOMEONE A MESSAGE ON [employer] REGARDING SCHEDULING LAB APPT. BUT DID NOT HEAR BACK YET WASN'T SURE WHO LEFT MESSAGE WITH .       APPT NOTES FOR 12/07 STATES PT TO GET LABS DONE WEEK PRIOR PT DID NOT HAVE APPT SCHEDULED AS OF YET      I CALLED AND SPOKE WITH PAIGE ON THE CLINICAL LINE AND SHE OFFERED TO FURTHER ASSIST AND SCHEDULE PT A LAB APPT STATED ALL OF THERE LABS THERE REQUIRE AN APPT      I WARM TRANSFERRED HARRIET WITH PAIGE ON THE CLINICAL LINE TO FURTHER ASSIST.

## 2021-12-02 ENCOUNTER — TELEPHONE (OUTPATIENT)
Dept: ONCOLOGY | Facility: CLINIC | Age: 31
End: 2021-12-02

## 2021-12-02 NOTE — TELEPHONE ENCOUNTER
Called patient back and she said the orders were not signed, she is going to go to the [place] office now to have labs done and will call back if she can't get it there.

## 2021-12-02 NOTE — TELEPHONE ENCOUNTER
Caller: Savanah Reyez    Relationship: Self    Best call back number: [phone]    What orders are you requesting (i.e. lab or imaging): LABS FOR 12/7    In what timeframe would the patient need to come in: PATIENT WENT FOR BLOODWORK ON 11/30 AND ORDERS WERE NOT IN    Where will you receive your lab/imaging services: [facility]

## 2021-12-06 NOTE — PROGRESS NOTES
HEMATOLOGY ONCOLOGY FOLLOW UP        Patient name: Savanah Reyez  : 1990  MRN: [id number]  Primary Care Physician: Anthony Iraheta MD  Referring Physician: Anthony Iraheta,*  Reason For Consult:     No chief complaint on file.      History of Present Illness:  Ms. Reyez is a pleasant 31 y.o.female who presents to our office on 18 for consultation regarding iron deficiency anemia.  She had lab work done on 18 that showed ferritin of 7 and iron saturation of 55.  Her hemoglobin was 10.4 on 7/3/18.  She is referred to us for iron deficiency anemia.  She reports history of menorrhagia.    • 7/3/18 - WBC 8.2, hemoglobin 10.4, platelet count 361,000, MCV 82.3.    • 7/10/18 - Creatinine 0.49, BUN 6.  LFTs normal.    • 18 - Iron saturation 5%, serum iron 18, TIBC 370.  Folate 12.7.  B12 543.  Ferritin 7.1.    2018 - The patient presents for initial consultation.  She reports irregular and heavy menstrual cycles.  She has to use four large pads for about 6-7 days each cycle.  She is not on any iron.  She reports stomach pain.  She had an episode of dark black stool last month.  She is scheduled for an upper GI endoscopy with Dr. Harding on 18.    • 18 - B12 606.   • 8/10/18 - EGD:  Grade 4 erosive esophagitis.  Distal esophageal stricture.  Grade 3 to grade 4 erosive gastritis and duodenitis.  Gastric biopsy was unremarkable.  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  Gastric fundus mucosa with chronic inflammation.    • 18 - Stool Hemoccult test negative x3.    • 18 - Ferritin 6.4 (L).  Iron saturation 6% (L), TIBC 405, serum iron 24.    • 19 - WBC 7.2, hemoglobin 13, platelet count 359,000.  Ferritin 10 (L).  Iron saturation 15%, TIBC 487 (H), serum iron 75.  (Patient was on iron p.o. two times a day.)  • 3/8/19 - Patient received Injectafer 750 mg x1.    • 3/15/19 - Patient received Injectafer 750 mg week  2.    • 4/23/19 - Serum iron 83, TIBC 251, iron saturation 25%.  WBC 8.1, hemoglobin 14.2, platelet count 337,000, MCV 88.9.  \  • 10/29/2019 WBC 7.1, hemoglobin 13.3, MCV 92.5, platelets 351, ferritin 131, iron 70  iron saturation 20%, TIBC 345  • 5/1/2020: WBC 6.9, hemoglobin 13.9, MCV 90.2, platelets 319, ferritin 53.6, iron saturation 39%, serum iron 143, TIBC 363  • 11/6/2020: Ferritin 36, , iron saturation 10% low, serum iron 39, TIBC 408, WBC 6.6, hemoglobin 12.7, platelets 338  • 5/18/2021: Iron 91, WBC 6.9, hemoglobin 13, platelets 356, ferritin 19, iron saturation 20%, TIBC at 458  • 12/7/2021 Ms. Reyez was in the office for follow-up.  She was completely asymptomatic.  She was tolerating the iron supplementation well.  Her anemia had resolved but she still appeared to have suboptimal iron.        Subjective:  12/7/2021 Ms. Reyez is in the office for follow-up.  She remains active and has had no limitations.  She is eating well and has not had any changes in weight.  She reports good compliance with the iron which is not resulting in any discomfort.  She has no new respiratory or cardiovascular symptoms.  She has continued to have some digestive symptoms but they are getting better with the treatment she is receiving from her primary care physician.  No edema    The following portions of the patient's history were reviewed and updated as appropriate: allergies, current medications, past family history, past medical history, past social history, past surgical history and problem list.   Past Medical History:   Diagnosis Date   • Anxiety    • Broken arm 2000   • Chronic headaches    • Hirsutism    • Hypothyroidism    • Irregular periods    • Metabolic syndrome    • Obesity    • PCOS (polycystic ovarian syndrome)    • Psoriasis      Past Surgical History:   Procedure Laterality Date   • CYST REMOVAL      under left breast   • ENDOSCOPY         Current Outpatient Medications:   •  Cholecalciferol (VITAMIN  D3) 50 MCG (2000 UT) capsule, Take 2,000 Units by mouth Daily., Disp: , Rfl:   •  ferrous gluconate (FERGON) 324 MG tablet, Take 1 tablet by mouth twice daily, Disp: 60 tablet, Rfl: 3  •  levothyroxine (SYNTHROID, LEVOTHROID) 25 MCG tablet, Take 25 mcg by mouth Daily., Disp: , Rfl:   •  metFORMIN (GLUCOPHAGE) 500 MG tablet, Take 500 mg by mouth 2 (Two) Times a Day., Disp: , Rfl: 0  •  metFORMIN ER (GLUCOPHAGE-XR) 500 MG 24 hr tablet, TAKE 2 TABLETS BY MOUTH ONCE DAILY FOR 90 DAYS, Disp: , Rfl:   •  omeprazole (priLOSEC) 40 MG capsule, , Disp: , Rfl:   •  sucralfate (CARAFATE) 1 g tablet, TAKE 1 TABLET BY MOUTH 4 TIMES DAILY FOR 30 DAYS, Disp: , Rfl: 6  •  TRI-SPRINTEC 0.18/0.215/0.25 MG-35 MCG per tablet, , Disp: , Rfl:   •  VITAMIN E PO, Take 1 tablet by mouth Daily., Disp: , Rfl:     No Known Allergies    Family History   Problem Relation Age of Onset   • Anemia Father      Cancer-related family history is not on file.    Social History     Tobacco Use   • Smoking status: Never Smoker   • Smokeless tobacco: Never Used   Substance Use Topics   • Alcohol use: No   • Drug use: No     I have reviewed the history of present illness, past medical history, family history, social history, lab results, all notes and other records since the patient was last seen on  Visit date not found.    ROS:   A 12 point review of systems was conducted.  Pertinent positives and negatives are documented above.    Objective:    There were no vitals filed for this visit.  ECOG  (0) Fully active, able to carry on all predisease performance without restriction    Physical Exam:   Physical Exam   Constitutional: She is oriented to person, place, and time. She appears well-developed.  Non-toxic appearance. She does not appear ill. No distress.   HENT:   Head: Normocephalic and atraumatic.   Right Ear: External ear normal.   Left Ear: External ear normal.   Nose: Nose normal. No rhinorrhea or congestion.   Mouth/Throat: Mucous membranes are  moist. No oropharyngeal exudate. Oropharynx is clear.   Eyes: Pupils are equal, round, and reactive to light. Conjunctivae are normal. Right eye exhibits no discharge. Left eye exhibits no discharge. No scleral icterus.   Neck: Carotid bruit is not present. No tracheal deviation present. No thyromegaly present.   Cardiovascular: Normal rate, regular rhythm, normal heart sounds and normal pulses. Exam reveals no friction rub.   No murmur heard.  Pulmonary/Chest: Effort normal and breath sounds normal. No stridor. No respiratory distress. She has no wheezes. She has no rhonchi. She has no rales. She exhibits no tenderness.   Abdominal: Soft. Normal appearance and bowel sounds are normal. She exhibits no distension and no mass. There is no abdominal tenderness. There is no rebound. No hernia.   Musculoskeletal: Normal range of motion. No deformity.      Cervical back: She exhibits no tenderness.      Right lower leg: No edema.      Left lower leg: No edema.   Lymphadenopathy:     She has no cervical adenopathy.   Neurological: She is alert and oriented to person, place, and time. No cranial nerve deficit. Coordination and gait normal.   Skin: Skin is warm. No bruising noted. She is not diaphoretic. No jaundice or pallor.   Psychiatric: Her behavior is normal. Mood, judgment and thought content normal.   Vitals reviewed.  Christo Garcia MD performed the physical exam on December 7, 2021 as documented above    Lab Results - Last 18 Months   Lab Units 11/06/20  1453   WBC x10E3/uL 6.6   HEMOGLOBIN g/dL 12.7   HEMATOCRIT % 37.5   PLATELETS x10E3/uL 338   MCV fL 85     Lab Results   Component Value Date    TIBC 408 11/06/2020    FERRITIN 36 11/06/2020     Assessment/Plan     Assessment:  1. Iron deficiency anemia: Has resolved at this time.  She continues to take the iron supplementation.  The iron studies suggest barely iron insufficiency.  I have instructed her to continue to take the iron.    PLAN:  1. As above she is  to see me in approximately 1 year.  She will continue to take the iron.  Instructions on the use of iron were given.      Christo Garcia MD on December 7, 2021 at 10:28 AM

## 2021-12-07 ENCOUNTER — OFFICE VISIT (OUTPATIENT)
Dept: ONCOLOGY | Facility: CLINIC | Age: 31
End: 2021-12-07

## 2021-12-07 VITALS
TEMPERATURE: 96.9 F | SYSTOLIC BLOOD PRESSURE: 122 MMHG | WEIGHT: 208 LBS | BODY MASS INDEX: 40.84 KG/M2 | HEIGHT: 60 IN | DIASTOLIC BLOOD PRESSURE: 76 MMHG

## 2021-12-07 DIAGNOSIS — D50.9 IRON DEFICIENCY ANEMIA, UNSPECIFIED IRON DEFICIENCY ANEMIA TYPE: Primary | ICD-10-CM

## 2021-12-07 PROCEDURE — 99212 OFFICE O/P EST SF 10 MIN: CPT | Performed by: INTERNAL MEDICINE

## 2021-12-20 DIAGNOSIS — D50.0 IRON DEFICIENCY ANEMIA DUE TO CHRONIC BLOOD LOSS: ICD-10-CM

## 2021-12-20 RX ORDER — DOXYCYCLINE HYCLATE 50 MG/1
1 CAPSULE, GELATIN COATED ORAL 2 TIMES DAILY
Qty: 60 TABLET | Refills: 3 | Status: SHIPPED | OUTPATIENT
Start: 2021-12-20 | End: 2022-09-06

## 2021-12-20 NOTE — TELEPHONE ENCOUNTER
Rx Refill Note  Requested Prescriptions     Pending Prescriptions Disp Refills   • ferrous gluconate (FERGON) 324 MG tablet 60 tablet 3     Sig: Take 1 tablet by mouth 2 (Two) Times a Day.      Last office visit with prescribing clinician: 12/7/2021      Next office visit with prescribing clinician: 12/6/2022            Kristyn Kendrick RN  12/20/21, 12:28 EST

## 2022-09-06 DIAGNOSIS — D50.0 IRON DEFICIENCY ANEMIA DUE TO CHRONIC BLOOD LOSS: ICD-10-CM

## 2022-09-06 RX ORDER — DOXYCYCLINE HYCLATE 50 MG/1
CAPSULE, GELATIN COATED ORAL
Qty: 60 TABLET | Refills: 3 | Status: SHIPPED | OUTPATIENT
Start: 2022-09-06

## 2022-09-06 NOTE — TELEPHONE ENCOUNTER
Rx Refill Note  Requested Prescriptions     Signed Prescriptions Disp Refills   • ferrous gluconate (FERGON) 324 MG tablet 60 tablet 3     Sig: Take 1 tablet by mouth twice daily     Authorizing Provider: NASREEN DSOUZA     Ordering User: SAFIA KENDRICK      Last office visit with prescribing clinician: 12/7/2021      Next office visit with prescribing clinician: 12/6/2022       {TIP  Please add Last Relevant Lab Date if appropriate:23}     Safia Kendrick RN  09/06/22, 11:32 EDT

## 2022-12-02 NOTE — PROGRESS NOTES
HEMATOLOGY ONCOLOGY FOLLOW UP        Patient name: Savanah Reyez  : 1990  MRN: [id number]  Primary Care Physician: Anthony Iraheta MD  Referring Physician: Anthony Iraheta,*  Reason For Consult:     No chief complaint on file.      History of Present Illness:  Ms. Reyez is a pleasant 32 y.o.female who presents to our office on 18 for consultation regarding iron deficiency anemia.  She had lab work done on 18 that showed ferritin of 7 and iron saturation of 55.  Her hemoglobin was 10.4 on 7/3/18.  She is referred to us for iron deficiency anemia.  She reports history of menorrhagia.    • 7/3/18 - WBC 8.2, hemoglobin 10.4, platelet count 361,000, MCV 82.3.    • 7/10/18 - Creatinine 0.49, BUN 6.  LFTs normal.    • 18 - Iron saturation 5%, serum iron 18, TIBC 370.  Folate 12.7.  B12 543.  Ferritin 7.1.    2018 - The patient presents for initial consultation.  She reports irregular and heavy menstrual cycles.  She has to use four large pads for about 6-7 days each cycle.  She is not on any iron.  She reports stomach pain.  She had an episode of dark black stool last month.  She is scheduled for an upper GI endoscopy with Dr. Harding on 18.    • 18 - B12 606.   • 8/10/18 - EGD:  Grade 4 erosive esophagitis.  Distal esophageal stricture.  Grade 3 to grade 4 erosive gastritis and duodenitis.  Gastric biopsy was unremarkable.  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  Gastric fundus mucosa with chronic inflammation.    • 18 - Stool Hemoccult test negative x3.    • 18 - Ferritin 6.4 (L).  Iron saturation 6% (L), TIBC 405, serum iron 24.    • 19 - WBC 7.2, hemoglobin 13, platelet count 359,000.  Ferritin 10 (L).  Iron saturation 15%, TIBC 487 (H), serum iron 75.  (Patient was on iron p.o. two times a day.)  • 3/8/19 - Patient received Injectafer 750 mg x1.    • 3/15/19 - Patient received Injectafer 750 mg week  2.    • 4/23/19 - Serum iron 83, TIBC 251, iron saturation 25%.  WBC 8.1, hemoglobin 14.2, platelet count 337,000, MCV 88.9.  \  • 10/29/2019 WBC 7.1, hemoglobin 13.3, MCV 92.5, platelets 351, ferritin 131, iron 70  iron saturation 20%, TIBC 345  • 5/1/2020: WBC 6.9, hemoglobin 13.9, MCV 90.2, platelets 319, ferritin 53.6, iron saturation 39%, serum iron 143, TIBC 363  • 11/6/2020: Ferritin 36, , iron saturation 10% low, serum iron 39, TIBC 408, WBC 6.6, hemoglobin 12.7, platelets 338  • 5/18/2021: Iron 91, WBC 6.9, hemoglobin 13, platelets 356, ferritin 19, iron saturation 20%, TIBC at 458  • 12/7/2021 Ms. Reyez was in the office for follow-up.  She was completely asymptomatic.  She was tolerating the iron supplementation well.  Her anemia had resolved but she still appeared to have suboptimal iron.  • 12/6/2022: Returned for follow-up and to review laboratory exams.  She was entirely asymptomatic and able to tolerate the oral iron without any difficulties.  Laboratory exams revealed perfectly normal blood counts.  Iron saturation was low and ferritin was marginal.  A decision was made to continue with oral supplementation with iron as she continues to have heavy menorrhagia.    Subjective:  12/6/2022: Feeling well.  No complaints at this time.  Active and working without difficulties.  Taking the iron supplementation without nausea or melena.  Eating well and no changes in weight.  No chest pains or cough.  No abdominal pain or diarrhea.  Denied hematuria.  Persisted with bleeding that lasted at least 5 days during which she had to change around 6 times a day a sanitary pad often completely saturated.  No edema.    The following portions of the patient's history were reviewed and updated as appropriate: allergies, current medications, past family history, past medical history, past social history, past surgical history and problem list.   Past Medical History:   Diagnosis Date   • Anxiety    • Broken arm 2000   •  Chronic headaches    • Hirsutism    • Hypothyroidism    • Irregular periods    • Metabolic syndrome    • Obesity    • PCOS (polycystic ovarian syndrome)    • Psoriasis      Past Surgical History:   Procedure Laterality Date   • CYST REMOVAL      under left breast   • ENDOSCOPY         Current Outpatient Medications:   •  Cholecalciferol (VITAMIN D3) 50 MCG (2000 UT) capsule, Take 2,000 Units by mouth Daily., Disp: , Rfl:   •  ferrous gluconate (FERGON) 324 MG tablet, Take 1 tablet by mouth twice daily, Disp: 60 tablet, Rfl: 3  •  levothyroxine (SYNTHROID, LEVOTHROID) 25 MCG tablet, Take 25 mcg by mouth Daily., Disp: , Rfl:   •  metFORMIN (GLUCOPHAGE) 500 MG tablet, Take 500 mg by mouth 2 (Two) Times a Day., Disp: , Rfl: 0  •  metFORMIN ER (GLUCOPHAGE-XR) 500 MG 24 hr tablet, TAKE 2 TABLETS BY MOUTH ONCE DAILY FOR 90 DAYS, Disp: , Rfl:   •  omeprazole (priLOSEC) 40 MG capsule, , Disp: , Rfl:   •  sucralfate (CARAFATE) 1 g tablet, TAKE 1 TABLET BY MOUTH 4 TIMES DAILY FOR 30 DAYS, Disp: , Rfl: 6  •  TRI-SPRINTEC 0.18/0.215/0.25 MG-35 MCG per tablet, , Disp: , Rfl:   •  VITAMIN E PO, Take 1 tablet by mouth Daily., Disp: , Rfl:     No Known Allergies    Family History   Problem Relation Age of Onset   • Anemia Father      Cancer-related family history is not on file.    Social History     Tobacco Use   • Smoking status: Never   • Smokeless tobacco: Never   Substance Use Topics   • Alcohol use: No   • Drug use: No     I have reviewed the history of present illness, past medical history, family history, social history, lab results, all notes and other records since the patient was last seen on  Visit date not found.    ROS:   A 12 point review systems was conducted.  Pertinent positives and negatives are documented above..    Objective:    There were no vitals filed for this visit.  ECOG  (0) Fully active, able to carry on all predisease performance without restriction    Physical Exam:   Physical Exam   Constitutional:  She is oriented to person, place, and time. She appears well-developed.  Non-toxic appearance. She does not appear ill. No distress.   HENT:   Head: Normocephalic and atraumatic.   Right Ear: External ear normal.   Left Ear: External ear normal.   Nose: Nose normal. No rhinorrhea or congestion.   Mouth/Throat: Mucous membranes are moist. No oropharyngeal exudate. Oropharynx is clear.   Eyes: Pupils are equal, round, and reactive to light. Conjunctivae are normal. Right eye exhibits no discharge. Left eye exhibits no discharge. No scleral icterus.   Neck: Carotid bruit is not present. No tracheal deviation present. No thyromegaly present.   Cardiovascular: Normal rate, regular rhythm, normal heart sounds and normal pulses. Exam reveals no friction rub.   No murmur heard.  Pulmonary/Chest: Effort normal and breath sounds normal. No stridor. No respiratory distress. She has no wheezes. She has no rhonchi. She has no rales. She exhibits no tenderness.   Abdominal: Soft. Normal appearance and bowel sounds are normal. She exhibits no distension and no mass. There is no abdominal tenderness. There is no rebound. No hernia.   Musculoskeletal: Normal range of motion. No deformity.      Cervical back: She exhibits no tenderness.      Right lower leg: No edema.      Left lower leg: No edema.   Lymphadenopathy:     She has no cervical adenopathy.   Neurological: She is alert and oriented to person, place, and time. No cranial nerve deficit. Coordination and gait normal.   Skin: Skin is warm. No bruising noted. She is not diaphoretic. No jaundice or pallor.   Psychiatric: Her behavior is normal. Mood, judgment and thought content normal.   Vitals reviewed.  Christo Garcia MD performed the physical exam on 12/6/2022 as documented above.    No results for input(s): WBC, HGB, HCT, PLT, MCV in the last 27805 hours.  Lab Results   Component Value Date    TIBC 408 11/06/2020    FERRITIN 36 11/06/2020     Assessment & Plan      Assessment:  1. Iron deficiency anemia: The anemia is resolved.  Persists with marginal iron.  Continue oral supplementation.  Discussed with her at length.  2. Reviewed the blood counts, the chemistry, the iron level and ferritin.  Discussed the results with her.  3. She is to see me in approximately 1 year.  If she remains stable at that time she will continue to follow-up with her primary care only.    PLAN:  1. As above      Christo Garcia MD on 12/6/2022 at 10:21 AM

## 2022-12-06 ENCOUNTER — OFFICE VISIT (OUTPATIENT)
Dept: ONCOLOGY | Facility: CLINIC | Age: 32
End: 2022-12-06

## 2022-12-06 VITALS
HEART RATE: 95 BPM | HEIGHT: 60 IN | TEMPERATURE: 97.8 F | BODY MASS INDEX: 41.23 KG/M2 | DIASTOLIC BLOOD PRESSURE: 76 MMHG | OXYGEN SATURATION: 99 % | WEIGHT: 210 LBS | SYSTOLIC BLOOD PRESSURE: 128 MMHG

## 2022-12-06 DIAGNOSIS — D50.0 IRON DEFICIENCY ANEMIA DUE TO CHRONIC BLOOD LOSS: Primary | ICD-10-CM

## 2022-12-06 PROBLEM — L68.0 HIRSUTISM: Status: ACTIVE | Noted: 2022-12-06

## 2022-12-06 PROBLEM — G89.29 CHRONIC HEADACHE DISORDER: Status: ACTIVE | Noted: 2022-12-06

## 2022-12-06 PROBLEM — E88.81 METABOLIC SYNDROME X: Status: ACTIVE | Noted: 2022-12-06

## 2022-12-06 PROBLEM — F41.9 ANXIETY: Status: ACTIVE | Noted: 2022-12-06

## 2022-12-06 PROBLEM — K21.00 GASTRO-ESOPHAGEAL REFLUX DISEASE WITH ESOPHAGITIS: Status: ACTIVE | Noted: 2020-10-08

## 2022-12-06 PROBLEM — N92.6 IRREGULAR PERIODS: Status: ACTIVE | Noted: 2021-09-08

## 2022-12-06 PROBLEM — E03.9 HYPOTHYROIDISM: Status: ACTIVE | Noted: 2022-12-06

## 2022-12-06 PROBLEM — E66.9 OBESITY: Status: ACTIVE | Noted: 2022-12-06

## 2022-12-06 PROBLEM — R13.10 DYSPHAGIA: Status: ACTIVE | Noted: 2020-10-08

## 2022-12-06 PROBLEM — E88.810 METABOLIC SYNDROME X: Status: ACTIVE | Noted: 2022-12-06

## 2022-12-06 PROBLEM — L40.9 PSORIASIS: Status: ACTIVE | Noted: 2022-12-06

## 2022-12-06 PROBLEM — R51.9 CHRONIC HEADACHE DISORDER: Status: ACTIVE | Noted: 2022-12-06

## 2022-12-06 PROCEDURE — 99213 OFFICE O/P EST LOW 20 MIN: CPT | Performed by: INTERNAL MEDICINE

## 2022-12-06 RX ORDER — BUPROPION HYDROCHLORIDE 300 MG/1
1 TABLET ORAL DAILY
COMMUNITY
Start: 2022-09-28

## 2022-12-06 RX ORDER — MULTIPLE VITAMINS W/ MINERALS TAB 9MG-400MCG
1 TAB ORAL DAILY
COMMUNITY

## 2023-12-01 ENCOUNTER — TELEPHONE (OUTPATIENT)
Dept: ONCOLOGY | Facility: CLINIC | Age: 33
End: 2023-12-01
Payer: COMMERCIAL

## 2023-12-01 ENCOUNTER — TELEPHONE (OUTPATIENT)
Dept: ONCOLOGY | Facility: CLINIC | Age: 33
End: 2023-12-01

## 2023-12-01 NOTE — TELEPHONE ENCOUNTER
Caller: Harriet Reyez    Relationship: Self    Best call back number: [phone]    What is the best time to reach you: ANY    Who are you requesting to speak with (clinical staff, provider,  specific staff member): SCHEDULING         What was the call regarding: PATIENT CALLED BACK FROM A MISSED CALL YESTERDAY. HARRIET STATES SHE HAS NEW INSURANCE THROUGH [employer] AND HER MEMBER ID # IS [id number]    SECONDLY, HARRIET REQUESTS TO HAVE LAB APPT BEFORE HER FOLLOW UP WITH DR DSOUZA ON TUESDAY 12/5/23. LAB ORDER EXPIRES ON 12/6/23    Is it okay if the provider responds through MyChart: NO

## 2023-12-01 NOTE — PROGRESS NOTES
HEMATOLOGY ONCOLOGY FOLLOW UP        Patient name: Savanah Reyez  : 1990  MRN: [id number]  Primary Care Physician: Anthony Iraheta MD  Referring Physician: Anthony Iraheta,*  Reason For Consult:     No chief complaint on file.      History of Present Illness:  Ms. Reyez is a pleasant 33 y.o.female who presents to our office on 18 for consultation regarding iron deficiency anemia.  She had lab work done on 18 that showed ferritin of 7 and iron saturation of 55.  Her hemoglobin was 10.4 on 7/3/18.  She is referred to us for iron deficiency anemia.  She reports history of menorrhagia.    7/3/18 - WBC 8.2, hemoglobin 10.4, platelet count 361,000, MCV 82.3.    7/10/18 - Creatinine 0.49, BUN 6.  LFTs normal.    18 - Iron saturation 5%, serum iron 18, TIBC 370.  Folate 12.7.  B12 543.  Ferritin 7.1.    2018 - The patient presents for initial consultation.  She reports irregular and heavy menstrual cycles.  She has to use four large pads for about 6-7 days each cycle.  She is not on any iron.  She reports stomach pain.  She had an episode of dark black stool last month.  She is scheduled for an upper GI endoscopy with Dr. Harding on 18.    18 - B12 606.   8/10/18 - EGD:  Grade 4 erosive esophagitis.  Distal esophageal stricture.  Grade 3 to grade 4 erosive gastritis and duodenitis.  Gastric biopsy was unremarkable.  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  Gastric fundus mucosa with chronic inflammation.    18 - Stool Hemoccult test negative x3.    18 - Ferritin 6.4 (L).  Iron saturation 6% (L), TIBC 405, serum iron 24.    19 - WBC 7.2, hemoglobin 13, platelet count 359,000.  Ferritin 10 (L).  Iron saturation 15%, TIBC 487 (H), serum iron 75.  (Patient was on iron p.o. two times a day.)  3/8/19 - Patient received Injectafer 750 mg x1.    3/15/19 - Patient received Injectafer 750 mg week 2.    19 -  Serum iron 83, TIBC 251, iron saturation 25%.  WBC 8.1, hemoglobin 14.2, platelet count 337,000, MCV 88.9.  \  10/29/2019 WBC 7.1, hemoglobin 13.3, MCV 92.5, platelets 351, ferritin 131, iron 70  iron saturation 20%, TIBC 345  5/1/2020: WBC 6.9, hemoglobin 13.9, MCV 90.2, platelets 319, ferritin 53.6, iron saturation 39%, serum iron 143, TIBC 363  11/6/2020: Ferritin 36, , iron saturation 10% low, serum iron 39, TIBC 408, WBC 6.6, hemoglobin 12.7, platelets 338  5/18/2021: Iron 91, WBC 6.9, hemoglobin 13, platelets 356, ferritin 19, iron saturation 20%, TIBC at 458  12/7/2021 Ms. Reyez was in the office for follow-up.  She was completely asymptomatic.  She was tolerating the iron supplementation well.  Her anemia had resolved but she still appeared to have suboptimal iron.  12/6/2022: Returned for follow-up and to review laboratory exams.  She was entirely asymptomatic and able to tolerate the oral iron without any difficulties.  Laboratory exams revealed perfectly normal blood counts.  Iron saturation was low and ferritin was marginal.  A decision was made to continue with oral supplementation with iron as she continues to have heavy menorrhagia.  12/5/2023: Entirely asymptomatic.  Taking the iron regularly.  Has noted that her menstrual bleeding is not as intense.  She is eating well and has had no nausea or vomiting.  She has been without any chest pains or cough.  On exam no changes.  The laboratory exams revealed a perfectly normal blood count with a white cell count of 6300/mm³ with an unremarkable differential count.  The hemoglobin is 13.7 g/dL with mean corpuscular volume of 89 fL and the platelet count is 321,000/mm³.  On the basis of this I have suggested to start taking the iron 2-3 times per week, as long as she continues to have intense vaginal bleeding.  She is to see me on an as-needed basis only.    Subjective:  12/5/2023: Feeling well and without new symptoms.  As active as before.  Eating well.   Weight same.  No fevers.  No chest pains or cough and no abdominal pain, diarrhea or dysuria.    The following portions of the patient's history were reviewed and updated as appropriate: allergies, current medications, past family history, past medical history, past social history, past surgical history and problem list.   Past Medical History:   Diagnosis Date    Anxiety     Broken arm 2000    Chronic headaches     Hirsutism     Hypothyroidism     Irregular periods     Metabolic syndrome     Obesity     PCOS (polycystic ovarian syndrome)     Psoriasis      Past Surgical History:   Procedure Laterality Date    CYST REMOVAL      under left breast    ENDOSCOPY         Current Outpatient Medications:     buPROPion XL (WELLBUTRIN XL) 300 MG 24 hr tablet, Take 1 tablet by mouth Daily., Disp: , Rfl:     Calcium Polycarbophil (FIBER-CAPS PO), Take 8 capsules by mouth Daily., Disp: , Rfl:     Cholecalciferol (VITAMIN D3) 50 MCG (2000 UT) capsule, Take 2,000 Units by mouth Daily., Disp: , Rfl:     ferrous gluconate (FERGON) 324 MG tablet, Take 1 tablet by mouth twice daily, Disp: 60 tablet, Rfl: 3    levothyroxine (SYNTHROID, LEVOTHROID) 25 MCG tablet, Take 25 mcg by mouth Daily., Disp: , Rfl:     metFORMIN (GLUCOPHAGE) 500 MG tablet, Take 500 mg by mouth 2 (Two) Times a Day., Disp: , Rfl: 0    metFORMIN ER (GLUCOPHAGE-XR) 500 MG 24 hr tablet, TAKE 2 TABLETS BY MOUTH ONCE DAILY FOR 90 DAYS, Disp: , Rfl:     multivitamin with minerals (MULTIVITAMIN ADULT PO), Take 1 tablet by mouth Daily., Disp: , Rfl:     omeprazole (priLOSEC) 40 MG capsule, , Disp: , Rfl:     sucralfate (CARAFATE) 1 g tablet, TAKE 1 TABLET BY MOUTH 4 TIMES DAILY FOR 30 DAYS, Disp: , Rfl: 6    TRI-SPRINTEC 0.18/0.215/0.25 MG-35 MCG per tablet, , Disp: , Rfl:     VITAMIN E PO, Take 1 tablet by mouth Daily., Disp: , Rfl:     No Known Allergies    Family History   Problem Relation Age of Onset    Anemia Father      Cancer-related family history is not on  file.    Social History     Tobacco Use    Smoking status: Never    Smokeless tobacco: Never   Substance Use Topics    Alcohol use: No    Drug use: No     I have reviewed the history of present illness, past medical history, family history, social history, lab results, all notes and other records since the patient was last seen on  Visit date not found.    ROS:   A 12 point review of systems was conducted.  Pertinent positives and negatives are documented above.    Objective:    There were no vitals filed for this visit.  ECOG  (0) Fully active, able to carry on all predisease performance without restriction    Physical Exam:   Physical Exam   Constitutional: She is oriented to person, place, and time. She appears well-developed.  Non-toxic appearance. She does not appear ill. No distress.   HENT:   Head: Normocephalic and atraumatic.   Right Ear: External ear normal.   Left Ear: External ear normal.   Nose: Nose normal. No rhinorrhea or congestion.   Mouth/Throat: Mucous membranes are moist. No oropharyngeal exudate. Oropharynx is clear.   Eyes: Pupils are equal, round, and reactive to light. Conjunctivae are normal. Right eye exhibits no discharge. Left eye exhibits no discharge. No scleral icterus.   Neck: Carotid bruit is not present. No tracheal deviation present. No thyromegaly present.   Cardiovascular: Normal rate, regular rhythm, normal heart sounds and normal pulses. Exam reveals no friction rub.   No murmur heard.  Pulmonary/Chest: Effort normal and breath sounds normal. No stridor. No respiratory distress. She has no wheezes. She has no rhonchi. She has no rales. She exhibits no tenderness.   Abdominal: Soft. Normal appearance and bowel sounds are normal. She exhibits no distension and no mass. There is no abdominal tenderness. There is no rebound. No hernia.   Musculoskeletal: Normal range of motion. No deformity.      Cervical back: She exhibits no tenderness.      Right lower leg: No edema.      Left  "lower leg: No edema.   Lymphadenopathy:     She has no cervical adenopathy.   Neurological: She is alert and oriented to person, place, and time. No cranial nerve deficit. Coordination and gait normal.   Skin: Skin is warm. No bruising noted. She is not diaphoretic. No jaundice or pallor.   Psychiatric: Her behavior is normal. Mood, judgment and thought content normal.   Vitals reviewed.  Christo Garcia MD performed the physical exam on 12/6/2022 as documented above.    No results for input(s): \"WBC\", \"HGB\", \"HCT\", \"PLT\", \"MCV\" in the last 17491 hours.  Lab Results   Component Value Date    TIBC 408 11/06/2020    FERRITIN 36 11/06/2020     Assessment & Plan     Assessment:  Iron deficiency anemia: Resolved.  Discussed with her and discussed the results of the laboratory exams.  She is to continue with the iron 2-3 times per week as long as her vaginal bleeding continues to be intense.  She does not need to see me again unless new need arises.  She was instructed to follow-up with her primary care physician.    PLAN:  As above.    Christo Garcia MD on 12/5/2023 at 10:21 AM.       "

## 2023-12-01 NOTE — TELEPHONE ENCOUNTER
Called to let Pt know to go to the PCP (across from the office Dr Garcia will see her on Tuesday) on Monday 12/4 to get labs for her appt on 12/5. She v/u

## 2023-12-05 ENCOUNTER — OFFICE VISIT (OUTPATIENT)
Dept: ONCOLOGY | Facility: CLINIC | Age: 33
End: 2023-12-05
Payer: COMMERCIAL

## 2023-12-05 VITALS — HEIGHT: 60 IN | OXYGEN SATURATION: 99 % | WEIGHT: 202 LBS | BODY MASS INDEX: 39.66 KG/M2 | HEART RATE: 106 BPM

## 2023-12-05 DIAGNOSIS — D50.0 IRON DEFICIENCY ANEMIA DUE TO CHRONIC BLOOD LOSS: Primary | ICD-10-CM

## 2023-12-05 LAB
BASOPHILS # BLD AUTO: 0.1 X10E3/UL (ref 0–0.2)
BASOPHILS NFR BLD AUTO: 1 %
EOSINOPHIL # BLD AUTO: 0.1 X10E3/UL (ref 0–0.4)
EOSINOPHIL NFR BLD AUTO: 1 %
ERYTHROCYTE [DISTWIDTH] IN BLOOD BY AUTOMATED COUNT: 11.9 % (ref 11.7–15.4)
HCT VFR BLD AUTO: 40.7 % (ref 34–46.6)
HGB BLD-MCNC: 13.7 G/DL (ref 11.1–15.9)
IMM GRANULOCYTES # BLD AUTO: 0 X10E3/UL (ref 0–0.1)
IMM GRANULOCYTES NFR BLD AUTO: 0 %
LYMPHOCYTES # BLD AUTO: 2.2 X10E3/UL (ref 0.7–3.1)
LYMPHOCYTES NFR BLD AUTO: 35 %
MCH RBC QN AUTO: 30 PG (ref 26.6–33)
MCHC RBC AUTO-ENTMCNC: 33.7 G/DL (ref 31.5–35.7)
MCV RBC AUTO: 89 FL (ref 79–97)
MONOCYTES # BLD AUTO: 0.6 X10E3/UL (ref 0.1–0.9)
MONOCYTES NFR BLD AUTO: 9 %
NEUTROPHILS # BLD AUTO: 3.4 X10E3/UL (ref 1.4–7)
NEUTROPHILS NFR BLD AUTO: 54 %
PLATELET # BLD AUTO: 321 X10E3/UL (ref 150–450)
RBC # BLD AUTO: 4.57 X10E6/UL (ref 3.77–5.28)
WBC # BLD AUTO: 6.3 X10E3/UL (ref 3.4–10.8)

## 2023-12-05 PROCEDURE — 99213 OFFICE O/P EST LOW 20 MIN: CPT | Performed by: INTERNAL MEDICINE

## 2023-12-05 RX ORDER — ERGOCALCIFEROL 1.25 MG/1
50000 CAPSULE ORAL
COMMUNITY
Start: 2023-09-25

## 2023-12-05 RX ORDER — DULAGLUTIDE 1.5 MG/.5ML
INJECTION, SOLUTION SUBCUTANEOUS
COMMUNITY
Start: 2023-11-27

## 2023-12-05 RX ORDER — PEN NEEDLE, DIABETIC 32 GX 1/4"
NEEDLE, DISPOSABLE MISCELLANEOUS
COMMUNITY
Start: 2023-11-10

## 2024-05-12 DIAGNOSIS — D50.0 IRON DEFICIENCY ANEMIA DUE TO CHRONIC BLOOD LOSS: ICD-10-CM

## 2024-05-13 RX ORDER — FERROUS GLUCONATE 324(38)MG
TABLET ORAL
Qty: 60 TABLET | Refills: 0 | OUTPATIENT
Start: 2024-05-13

## 2024-05-31 DIAGNOSIS — D50.0 IRON DEFICIENCY ANEMIA DUE TO CHRONIC BLOOD LOSS: ICD-10-CM

## 2024-05-31 RX ORDER — FERROUS GLUCONATE 324(38)MG
TABLET ORAL
Qty: 60 TABLET | Refills: 0 | OUTPATIENT
Start: 2024-05-31

## 2024-06-02 DIAGNOSIS — D50.0 IRON DEFICIENCY ANEMIA DUE TO CHRONIC BLOOD LOSS: ICD-10-CM

## 2024-06-03 RX ORDER — FERROUS GLUCONATE 324(38)MG
TABLET ORAL
Qty: 60 TABLET | Refills: 0 | OUTPATIENT
Start: 2024-06-03